# Patient Record
Sex: FEMALE | Race: WHITE | NOT HISPANIC OR LATINO | Employment: PART TIME | URBAN - METROPOLITAN AREA
[De-identification: names, ages, dates, MRNs, and addresses within clinical notes are randomized per-mention and may not be internally consistent; named-entity substitution may affect disease eponyms.]

---

## 2023-11-14 ENCOUNTER — APPOINTMENT (OUTPATIENT)
Dept: ORTHOPEDIC SURGERY | Facility: CLINIC | Age: 23
End: 2023-11-14
Payer: COMMERCIAL

## 2023-12-14 ENCOUNTER — OFFICE VISIT (OUTPATIENT)
Dept: ORTHOPEDIC SURGERY | Facility: CLINIC | Age: 23
End: 2023-12-14
Payer: COMMERCIAL

## 2023-12-14 DIAGNOSIS — Q78.0: Primary | ICD-10-CM

## 2023-12-14 DIAGNOSIS — M54.6 THORACIC SPINE PAIN: ICD-10-CM

## 2023-12-14 DIAGNOSIS — R07.81 RIB PAIN ON RIGHT SIDE: ICD-10-CM

## 2023-12-14 DIAGNOSIS — M54.50 LOW BACK PAIN ASSOCIATED WITH A SPINAL DISORDER OTHER THAN RADICULOPATHY OR SPINAL STENOSIS: ICD-10-CM

## 2023-12-14 PROCEDURE — 99204 OFFICE O/P NEW MOD 45 MIN: CPT | Performed by: PHYSICAL MEDICINE & REHABILITATION

## 2023-12-14 RX ORDER — RISPERIDONE 0.5 MG/1
0.5 TABLET ORAL
COMMUNITY
End: 2024-05-28 | Stop reason: WASHOUT

## 2023-12-14 RX ORDER — TIZANIDINE 4 MG/1
4 TABLET ORAL EVERY 6 HOURS PRN
Qty: 45 TABLET | Refills: 1 | Status: SHIPPED | OUTPATIENT
Start: 2023-12-14 | End: 2024-01-06

## 2023-12-14 RX ORDER — RIZATRIPTAN BENZOATE 10 MG/1
TABLET ORAL
COMMUNITY

## 2023-12-14 SDOH — SOCIAL STABILITY: SOCIAL NETWORK: SOCIAL ACTIVITY:: 7

## 2023-12-14 NOTE — PROGRESS NOTES
PM&R  / Ortho clinic eval:    IMPRESSION:    Lumbar injury lifting her infant October 1, possible lumbar posterior element fracture  Thoracic pain and rib pain on the right is probably exacerbated by abnormal lumbar mechanics.  Separately also has severe thoracic pain, radiating between her shoulder blades, she does have a history of rib fracture and may have some intercostal neuritis, but also could have endplate fracture there  History of osteogenesis imperfecta type I with over 50 fractures in her young life already    RECOMMENDATIONS:  -Needs lumbar and thoracic MRI  without contrast to help exclude subtle fracture.  This is extremely necessary but there history of osteogenesis imperfecta and multiple fractures in the past, but in addition she has failure of physical therapy since October 1, 2023, and unable to take anti-inflammatories because of breast-feeding  -She is agreeable to stop breast-feeding, this may help reduce relaxin hormone and give her joints and bones a little bit more support.  In this case we will prescribe tizanidine, continue lidocaine patch and Tylenol.  If there is no fracture on the MRI she could start ibuprofen or Aleve  f/u with Dr. Marcia Howard D.O. in her fellows clinic, I will try to attend in case she needs trigger point or intercostal nerve blocks with ultrasound.    Diagnoses and plan discussed with the patient, patient educated on above diagnoses and treatments, including alternatives     Juan Arguelles MD, FAAPMR, R-MSK  Chief, Division of PM&R  Board Certified in PM&R and Sports Medicine  ____________________________________________________________________    12/14/2023    CC: Patient complains of LBP    HPI:   Young mom not working - h/o Osteogenesis imperfecta type 1 with h/o multiple fractures - ~60  4 spinal fx, all long bones, ribs, sternum.     Seen at the request of self for  LBP since Oct 1 - . Had first child July '23 - was getting baby out of car and felt pull  in low back - had xray at [facility] read as negative.    The pain is right lumbar without radiation,  increases with twisting, bending and is relieved by rest but also painful to change positions in bed.    Pain scale  8/10 on average and 10/10 worst pain in past week.        Separately she is also having pain in the thoracic spine with radiation to both sides, up between her shoulder blades, this part not down her limbs.    Patient reports no fevers, chills, sweats, night pain, weight loss or cancer history . No bowel or bladder issues    I reviewed the Orthopedic Surgery Adult Patient History Sheet from 12/14/2023  including pertinent history and ROS.      Pertinent Physical Exam:  MSK: Thoraco-lumbar Spine: Mod reduced ROM all planes with pain in all planes but quite limited with right lateral bending and extension with reactive spasm.  Thoracic spine also moderately reduced not tested aggressively, tender to palpation right paraspinals about L3 and mid thoracic paraspinals bilateral, SI joint maneuvers negative.  Str Leg Raise negative but does increased low back pain significantly, hip provocative maneuvers negative .  Neuro: Normal Sensation, strength, bulk and tone of upper  lower limbs bilaterally, reflexes normal    SUPPORTING DOCUMENTATION (remaining history, exam, other findings):  Work-up reviewed - this has included x-rays at [facility] are unavailable but negative per patient.  Previous x-rays did show some type of slippage in the lumbar spine    Treatment has included has prescription for lidocaine patch, takes Tylenol, unable to take others because of breast-feeding, she was already in physical therapy for different problems when she did this in October 1, and just restarted with lumbar treatment and therapy then    See above for Assessment and Plan

## 2023-12-27 ENCOUNTER — OFFICE VISIT (OUTPATIENT)
Dept: ORTHOPEDIC SURGERY | Facility: CLINIC | Age: 23
End: 2023-12-27
Payer: COMMERCIAL

## 2023-12-27 DIAGNOSIS — R07.81 RIB PAIN: ICD-10-CM

## 2023-12-27 PROCEDURE — 76942 ECHO GUIDE FOR BIOPSY: CPT | Performed by: STUDENT IN AN ORGANIZED HEALTH CARE EDUCATION/TRAINING PROGRAM

## 2023-12-27 PROCEDURE — 20552 NJX 1/MLT TRIGGER POINT 1/2: CPT | Performed by: STUDENT IN AN ORGANIZED HEALTH CARE EDUCATION/TRAINING PROGRAM

## 2023-12-27 PROCEDURE — 99213 OFFICE O/P EST LOW 20 MIN: CPT | Performed by: STUDENT IN AN ORGANIZED HEALTH CARE EDUCATION/TRAINING PROGRAM

## 2023-12-27 PROCEDURE — 2500000004 HC RX 250 GENERAL PHARMACY W/ HCPCS (ALT 636 FOR OP/ED): Performed by: STUDENT IN AN ORGANIZED HEALTH CARE EDUCATION/TRAINING PROGRAM

## 2023-12-27 PROCEDURE — 2500000005 HC RX 250 GENERAL PHARMACY W/O HCPCS: Performed by: STUDENT IN AN ORGANIZED HEALTH CARE EDUCATION/TRAINING PROGRAM

## 2023-12-27 RX ORDER — TRIAMCINOLONE ACETONIDE 40 MG/ML
20 INJECTION, SUSPENSION INTRA-ARTICULAR; INTRAMUSCULAR ONCE
Status: COMPLETED | OUTPATIENT
Start: 2023-12-27 | End: 2023-12-27

## 2023-12-27 RX ORDER — LIDOCAINE HYDROCHLORIDE 10 MG/ML
1 INJECTION, SOLUTION EPIDURAL; INFILTRATION; INTRACAUDAL; PERINEURAL ONCE
Status: COMPLETED | OUTPATIENT
Start: 2023-12-27 | End: 2023-12-27

## 2023-12-27 RX ADMIN — LIDOCAINE HYDROCHLORIDE 10 MG: 10 INJECTION, SOLUTION EPIDURAL; INFILTRATION; INTRACAUDAL; PERINEURAL at 09:33

## 2023-12-27 RX ADMIN — TRIAMCINOLONE ACETONIDE 20 MG: 40 INJECTION, SUSPENSION INTRA-ARTICULAR; INTRAMUSCULAR at 09:34

## 2023-12-27 ASSESSMENT — PAIN SCALES - GENERAL: PAINLEVEL_OUTOF10: 10 - WORST POSSIBLE PAIN

## 2023-12-27 NOTE — PROGRESS NOTES
Assessment     Laisha is a 23 y.o. female with significant past medical history of MDD, osteogenesis imperfecta, recent delivery ( ) , who presents with worsening intercostal pain .  The patient's symptoms, clinical exam and imaging studies are suggestive of  costochondritis  .  The other possible differential diagnosis(es) include(s):  myofacial pain.      Her symptoms have worsened since the last visit. Hasn't had her Mri as of yet.    Injections Hx:    Date/Injection Type/Location/%relief/ Lasting  - No Hx    Plan     At this time, I would like to make the following recommendation/plan:  1.  Physical Therapy: currently enrolled in PT (since October).  2.  Medication: tylenol prn   3.  Injections: Trigger point injection to intercostal /transverse abdominal muscles. See procedure note below  4.  Imaging: MRI thoracic/ lumbar spine pending     Follow up:  Follow up for MRI review     US Guided trigger point Injection:    Indication: costochondritis     Procedure: Sonographically guided abdominal and intercostal trigger point  injection    Informed Consent: Following denial of allergy and review of potential side effects and complications including, but not limited to, infection, allergic reaction, local tissue breakdown, systemic effects of corticosteroids, elevation of blood glucose, injury to soft tissue and/or nerves, the patient indicated understanding and agreed to proceed.    Technique:  Pre-procedural scanning was performed to determine optimal needle approach for the procedure. The patient was prepped in the usual sterile fashion. Live sonographic guidance with a [12-5] mHz [linear] array transducer was used throughout the procedure. A 22 gauge needle was inserted into the superficial intercostal space using in plane approach. 1ml 1% lidocaine and 0.5 ml of kenalog (20mg) was then injected. The needle was removed and the area cleansed and dressed.    Post-Procedure Instructions: The patient  tolerated the procedure well without complication and was discharged in good condition after a short observation period. The patient was instructed to avoid submerging the procedure site in water for 48-72 hours. The patient was instructed to contact me with any questions pertaining to the procedure and to inform me of the results of the procedure in approximately 7-10 days, as needed. Questions regarding general management should be directed to the patient's referring provider and the patient should keep all previously scheduled follow up appointments.    Multiple key images were saved.      Subjective    Chief Complaint: Right chest wall pain     HPI:  Laisha Moya is a 23 y.o. female, new mom, with pertinent PMH of  MDD, osteogenesis imperfecta, recent delivery ( ) who is following up today for costocondralgia.  She was last seen on 23. Plan at the time was to obtain MRI of thoracic and lumbar spine and consider intercostal nerve block in the future.  Since her last visit symptoms have improved  Today, pain is located right anterior chest wall under her right breast, described as ruth non-radiating, 7/10. Unable to hold her baby 2/2 pain.    Since last visit:  - Physical Therapy: continuing with PT   - Imaging:  MRI scheduled for 2024      ROS:   12-point review of systems was completed and is otherwise negative except as noted in the HPI.      Objective     Physical Exam  General:  Appears state age, in NAD, and well nourished  Psychological:  Normal mood and affect  Pulm:  Breathing comfortably on RA  Gait:  normal without assistive device =  Inspection:   - erythema noted on right breast   Palpation:  - tenderness to palpation of  right ~T7-T8 intercostal space    Imaging and Other Studies:    No results found.

## 2024-01-08 ENCOUNTER — APPOINTMENT (OUTPATIENT)
Dept: RADIOLOGY | Facility: CLINIC | Age: 24
End: 2024-01-08
Payer: COMMERCIAL

## 2024-01-11 ENCOUNTER — HOSPITAL ENCOUNTER (OUTPATIENT)
Dept: RADIOLOGY | Facility: CLINIC | Age: 24
Discharge: HOME | End: 2024-01-11
Payer: COMMERCIAL

## 2024-01-11 ENCOUNTER — APPOINTMENT (OUTPATIENT)
Dept: RADIOLOGY | Facility: CLINIC | Age: 24
End: 2024-01-11
Payer: COMMERCIAL

## 2024-01-11 DIAGNOSIS — Q78.0: ICD-10-CM

## 2024-01-11 DIAGNOSIS — R07.81 RIB PAIN ON RIGHT SIDE: ICD-10-CM

## 2024-01-11 DIAGNOSIS — M54.6 THORACIC SPINE PAIN: ICD-10-CM

## 2024-01-11 DIAGNOSIS — M54.50 LOW BACK PAIN ASSOCIATED WITH A SPINAL DISORDER OTHER THAN RADICULOPATHY OR SPINAL STENOSIS: ICD-10-CM

## 2024-01-11 PROCEDURE — 72146 MRI CHEST SPINE W/O DYE: CPT

## 2024-01-11 PROCEDURE — 72146 MRI CHEST SPINE W/O DYE: CPT | Performed by: RADIOLOGY

## 2024-01-11 PROCEDURE — 72148 MRI LUMBAR SPINE W/O DYE: CPT

## 2024-01-11 PROCEDURE — 72148 MRI LUMBAR SPINE W/O DYE: CPT | Performed by: RADIOLOGY

## 2024-01-17 ENCOUNTER — APPOINTMENT (OUTPATIENT)
Dept: ORTHOPEDIC SURGERY | Facility: CLINIC | Age: 24
End: 2024-01-17
Payer: COMMERCIAL

## 2024-01-17 ENCOUNTER — OFFICE VISIT (OUTPATIENT)
Dept: ORTHOPEDIC SURGERY | Facility: CLINIC | Age: 24
End: 2024-01-17
Payer: COMMERCIAL

## 2024-01-17 DIAGNOSIS — M94.0 COSTOCHONDRITIS: Primary | ICD-10-CM

## 2024-01-17 PROCEDURE — 99213 OFFICE O/P EST LOW 20 MIN: CPT | Performed by: STUDENT IN AN ORGANIZED HEALTH CARE EDUCATION/TRAINING PROGRAM

## 2024-01-17 NOTE — PROGRESS NOTES
Assessment     Laisha is a 23 y.o. female with significant past medical history of MDD, osteogenesis imperfecta, recent delivery ( ) , who presents with worsening intercostal pain .  The patient's symptoms, clinical exam and imaging studies are suggestive of  costochondritis  .  The other possible differential diagnosis(es) include(s):  myofacial pain.      Her symptoms have worsened since the last visit.  Thoracolumbar MRI was completed and unremarkable     Injections Hx:    Date/Injection Type/Location/%relief/ Lasting  - No Hx    Plan     At this time, I would like to make the following recommendation/plan:  1.  Physical Therapy: Currently enrolled in PT (since October).  2.  Medication: tylenol prn, advised to try topical treatment and c/w lidocaine patches   3.  Injections: s/p Trigger point injection to intercostal /transverse abdominal muscles. See procedure note below  4.  Imaging: MRI thoracic/ lumbar spine completed unremarkable   5.  Consider intercostal n. Block under fluoro vs intervertebral n. Block (referral to anesthesia pain)     Follow up:  Follow up after completing breast feeding    Subjective    Chief Complaint: Right chest wall pain     HPI:  Laisha Moya is a 23 y.o. female, new mom, with pertinent PMH of  MDD, osteogenesis imperfecta, recent delivery ( ) who is following up today for costocondralgia.  She was last seen on 23. Plan at the time was to obtain MRI of thoracic and lumbar spine and consider intercostal nerve block in the future.  Since her last visit symptoms have worsened  Today, pain is located right anterior chest wall under her right breast, described as ruth non-radiating, 7/10. Unable to hold her baby 2/2 pain.    Since last visit:  - Physical Therapy: continuing with PT   - Imaging:  MRI thoracolumbar spine completed   - s/p injection with worsening symptoms post trigger point injection.     ROS:   12-point review of systems was  completed and is otherwise negative except as noted in the HPI.      Objective     Physical Exam  General:  Appears state age, in NAD, and well nourished  Psychological:  Normal mood and affect  Pulm:  Breathing comfortably on RA  Gait:  normal without assistive device   Palpation:  - tenderness to palpation of  right ~T7-T8 intercostal space    Imaging and Other Studies:    No results found.

## 2024-02-13 DIAGNOSIS — M93.29: ICD-10-CM

## 2024-02-29 ENCOUNTER — APPOINTMENT (OUTPATIENT)
Dept: ORTHOPEDIC SURGERY | Facility: CLINIC | Age: 24
End: 2024-02-29
Payer: COMMERCIAL

## 2024-05-22 ENCOUNTER — HOSPITAL ENCOUNTER (OUTPATIENT)
Dept: RADIOLOGY | Facility: EXTERNAL LOCATION | Age: 24
Discharge: HOME | End: 2024-05-22
Payer: COMMERCIAL

## 2024-05-22 DIAGNOSIS — M54.50 BACK PAIN AT L4-L5 LEVEL: ICD-10-CM

## 2024-05-28 ENCOUNTER — OFFICE VISIT (OUTPATIENT)
Dept: OBSTETRICS AND GYNECOLOGY | Facility: CLINIC | Age: 24
End: 2024-05-28
Payer: COMMERCIAL

## 2024-05-28 VITALS
BODY MASS INDEX: 28.32 KG/M2 | HEIGHT: 60 IN | WEIGHT: 144.25 LBS | SYSTOLIC BLOOD PRESSURE: 110 MMHG | DIASTOLIC BLOOD PRESSURE: 78 MMHG

## 2024-05-28 DIAGNOSIS — Z30.09 ENCOUNTER FOR GENERAL COUNSELING AND ADVICE ON CONTRACEPTIVE MANAGEMENT: Primary | ICD-10-CM

## 2024-05-28 PROCEDURE — 1036F TOBACCO NON-USER: CPT | Performed by: ADVANCED PRACTICE MIDWIFE

## 2024-05-28 PROCEDURE — 99203 OFFICE O/P NEW LOW 30 MIN: CPT | Performed by: ADVANCED PRACTICE MIDWIFE

## 2024-05-28 RX ORDER — CYCLOBENZAPRINE HCL 10 MG
TABLET ORAL
COMMUNITY
Start: 2024-05-21

## 2024-05-28 RX ORDER — ACETAMINOPHEN 500 MG
10000 TABLET ORAL
COMMUNITY

## 2024-05-28 RX ORDER — SERTRALINE HYDROCHLORIDE 100 MG/1
200 TABLET, FILM COATED ORAL DAILY
COMMUNITY

## 2024-05-28 NOTE — PROGRESS NOTES
"Raghavendra Moya is a 23 y.o. female who is here for a routine annual gynecology exam. No vaginal concerns at this time including abnormal discharge, odor or discomfort. She is sexually active and has no concern for STI exposure. She has no pain or bleeding with sex. She denies bladder or bowel concerns. She denies breast changes or concerns.    Concerns for this visit:     Current contraception: {contraceptive method:5051}  LMP: ***  Menses:   Last pap: ***  History of abnormal Pap smear: {yes/no:98864::\"no\"}  Due for pap: {yes/no:21434::\"no\"}  Family history of uterine or ovarian cancer: {yes***/no:01602::\"no\"}  Family history of prostate cancer: {yes***/no:62330::\"no\"}  Family history of pancreatic cancer: {yes***/no:37109::\"no\"}  Family hx of BRCA1/BRCA2: {yes***/no:01765::\"no\"}  Family history of breast cancer: {yes***/no:86143::\"no\"}  Last mammogram: ***  Gardasil: ***    OB History    No obstetric history on file.         Review of Systems    Objective   There were no vitals taken for this visit.  Physical Exam     Assessment/Plan: 23 y.o. yo woman for annual GYN exam    Diagnoses and all orders for this visit:  Well woman exam with routine gynecological exam       Points of Discussion:    Abnormal bleeding parameters that would prompt a call  Current PAP guidelines (ASCCP)  Self breast exam encouraged. Mammogram screening by current guidelines  Diet and exercise  Routine follow up with PCP for health maintenance examination encouraged   Follow up results by portal unless otherwise indicated    F/U 1 year or as needed  "

## 2024-06-01 PROBLEM — F32.9 MDD (MAJOR DEPRESSIVE DISORDER): Status: ACTIVE | Noted: 2023-12-05

## 2024-06-01 PROBLEM — G43.719 INTRACTABLE CHRONIC MIGRAINE WITHOUT AURA: Status: ACTIVE | Noted: 2020-06-24

## 2024-06-01 PROBLEM — G47.10 HYPERSOMNIA: Status: ACTIVE | Noted: 2019-08-20

## 2024-06-01 PROBLEM — M79.18 MYOFASCIAL PAIN: Status: ACTIVE | Noted: 2023-10-10

## 2024-06-01 PROBLEM — F51.04 CHRONIC INSOMNIA: Status: ACTIVE | Noted: 2021-08-17

## 2024-06-01 PROBLEM — M62.89 HIGH-TONE PELVIC FLOOR DYSFUNCTION: Status: ACTIVE | Noted: 2023-08-01

## 2024-06-01 PROBLEM — R27.8 MUSCULAR INCOORDINATION: Status: ACTIVE | Noted: 2024-05-13

## 2024-06-01 PROBLEM — Q07.9: Status: ACTIVE | Noted: 2023-05-16

## 2024-06-01 PROBLEM — M41.9 SCOLIOSIS: Status: ACTIVE | Noted: 2023-02-13

## 2024-12-28 ENCOUNTER — ANCILLARY PROCEDURE (OUTPATIENT)
Dept: URGENT CARE | Age: 24
End: 2024-12-28
Payer: COMMERCIAL

## 2024-12-28 ENCOUNTER — OFFICE VISIT (OUTPATIENT)
Dept: URGENT CARE | Age: 24
End: 2024-12-28
Payer: COMMERCIAL

## 2024-12-28 VITALS
HEART RATE: 109 BPM | DIASTOLIC BLOOD PRESSURE: 68 MMHG | RESPIRATION RATE: 20 BRPM | SYSTOLIC BLOOD PRESSURE: 129 MMHG | WEIGHT: 144 LBS | OXYGEN SATURATION: 100 % | TEMPERATURE: 97.9 F | BODY MASS INDEX: 28.12 KG/M2

## 2024-12-28 DIAGNOSIS — M79.672 LEFT FOOT PAIN: Primary | ICD-10-CM

## 2024-12-28 DIAGNOSIS — M25.572 ACUTE LEFT ANKLE PAIN: ICD-10-CM

## 2024-12-28 DIAGNOSIS — S82.832A OTHER CLOSED FRACTURE OF DISTAL END OF LEFT FIBULA, INITIAL ENCOUNTER: ICD-10-CM

## 2024-12-28 DIAGNOSIS — M79.672 LEFT FOOT PAIN: ICD-10-CM

## 2024-12-28 PROCEDURE — 73630 X-RAY EXAM OF FOOT: CPT | Mod: LEFT SIDE | Performed by: FAMILY MEDICINE

## 2024-12-28 PROCEDURE — 73610 X-RAY EXAM OF ANKLE: CPT | Mod: LEFT SIDE | Performed by: FAMILY MEDICINE

## 2024-12-31 ENCOUNTER — OFFICE VISIT (OUTPATIENT)
Dept: ORTHOPEDIC SURGERY | Facility: CLINIC | Age: 24
End: 2024-12-31
Payer: COMMERCIAL

## 2024-12-31 VITALS — HEIGHT: 61 IN | WEIGHT: 140 LBS | BODY MASS INDEX: 26.43 KG/M2

## 2024-12-31 DIAGNOSIS — S82.832A CLOSED FRACTURE OF DISTAL END OF LEFT FIBULA, UNSPECIFIED FRACTURE MORPHOLOGY, INITIAL ENCOUNTER: ICD-10-CM

## 2024-12-31 PROCEDURE — 1036F TOBACCO NON-USER: CPT | Performed by: FAMILY MEDICINE

## 2024-12-31 PROCEDURE — 99204 OFFICE O/P NEW MOD 45 MIN: CPT | Performed by: FAMILY MEDICINE

## 2024-12-31 PROCEDURE — 99213 OFFICE O/P EST LOW 20 MIN: CPT | Mod: 57 | Performed by: FAMILY MEDICINE

## 2024-12-31 PROCEDURE — L4361 PNEUMA/VAC WALK BOOT PRE OTS: HCPCS | Performed by: FAMILY MEDICINE

## 2024-12-31 PROCEDURE — 27786 TREATMENT OF ANKLE FRACTURE: CPT | Performed by: FAMILY MEDICINE

## 2024-12-31 PROCEDURE — 3008F BODY MASS INDEX DOCD: CPT | Performed by: FAMILY MEDICINE

## 2024-12-31 ASSESSMENT — PATIENT HEALTH QUESTIONNAIRE - PHQ9
2. FEELING DOWN, DEPRESSED OR HOPELESS: NOT AT ALL
1. LITTLE INTEREST OR PLEASURE IN DOING THINGS: NOT AT ALL
SUM OF ALL RESPONSES TO PHQ9 QUESTIONS 1 AND 2: 0

## 2025-01-01 NOTE — PROGRESS NOTES
Acute Injury New Patient Visit    CC:   Chief Complaint   Patient presents with    Left Ankle - Pain, New Patient Visit     Rolled Left Ankle in Hole 12/28/24 - X-Rays @ [facility]       HPI: Laisha is a 24 y.o.female who presents today with new complaints of acute pain discomfort to the lateral sided left ankle.  She states she tripped in a hole going down the porch steps the other day she was seen and evaluated at local urgent care where she was given a short painful walking boot which does not really fit well and provides more discomfort than without.  She presents here today for further evaluation.  She has a known history of osteogenesis imperfecta and takes supplements and vitamin D to assist with her bones.        Review of Systems   GENERAL: Negative for malaise, significant weight loss, fever  MUSCULOSKELETAL: See HPI  NEURO: Negative for numbness / tingling     Past Medical History  History reviewed. No pertinent past medical history.    Medication review  Medication Documentation Review Audit       Reviewed by Cole C Budinsky, MD (Physician) on 01/01/25 at 1240      Medication Order Taking? Sig Documenting Provider Last Dose Status   cholecalciferol (Vitamin D-3) 5,000 Units tablet [id number]  Take 2 tablets (10,000 Units) by mouth once daily. Historical MD [place]  Active   cyclobenzaprine (Flexeril) 10 mg tablet [id number]  PLEASE SEE ATTACHED FOR DETAILED DIRECTIONS Deshawn Ho MD  Active   linaclotide (LINZESS ORAL) [id number]  Take by mouth. Deshawn Ho MD  Active   PRENATAL VIT 10-IRON-FOLIC-DHA ORAL [id number]  Take by mouth. Deshawn Ho MD  Active   rizatriptan (Maxalt) 10 mg tablet [id number]  TAKE 1 TABLET BY MOUTH AS NEEDED FOR MIGRAINE CAN REPEAT IN 2 HOURS IF NEEDED LIMIT 2 TABLETS PER 24 HOURS Deshawn Ho MD  Active   sertraline (Zoloft) 100 mg tablet [id number]  Take 2 tablets (200 mg) by mouth once daily. Deshawn Ho MD  Active    tiZANidine (Zanaflex) 4 mg tablet [id number]  Take 1 tablet (4 mg) by mouth every 6 hours if needed for muscle spasms for up to 23 days. Juan Arguelles MD   [phone]                    Allergies  Allergies   Allergen Reactions    Suprax [Cefixime] Hives       Social History  Social History     Socioeconomic History    Marital status: Single     Spouse name: Not on file    Number of children: Not on file    Years of education: Not on file    Highest education level: Not on file   Occupational History    Not on file   Tobacco Use    Smoking status: Never    Smokeless tobacco: Never   Vaping Use    Vaping status: Never Used   Substance and Sexual Activity    Alcohol use: Never    Drug use: Never    Sexual activity: Not Currently   Other Topics Concern    Not on file   Social History Narrative    Not on file     Social Drivers of Health     Financial Resource Strain: Patient Declined (12/3/2023)    Received from [facility]    Overall Financial Resource Strain (CARDIA)     Difficulty of Paying Living Expenses: Patient declined   Food Insecurity: No Food Insecurity (12/3/2023)    Received from [facility]    Hunger Vital Sign     Worried About Running Out of Food in the Last Year: Never true     Ran Out of Food in the Last Year: Never true   Transportation Needs: No Transportation Needs (12/3/2023)    Received from [facility]    PRAPARE - Transportation     Lack of Transportation (Medical): No     Lack of Transportation (Non-Medical): No   Physical Activity: Patient Declined (12/3/2023)    Received from [facility]    Exercise Vital Sign     Days of Exercise per Week: Patient declined     Minutes of Exercise per Session: Patient declined   Stress: Stress Concern Present (12/3/2023)    Received from [facility]    [place] [employer] - Occupational Stress Questionnaire      Feeling of Stress : To some extent   Social Connections: Unknown (12/3/2023)    [facility]    Social Connection and Isolation Panel [NHANES]     Frequency of Communication with Friends and Family: More than three times a week     Frequency of Social Gatherings with Friends and Family: Twice a week     Attends Orthodox Services: Patient declined     Active Member of Clubs or Organizations: No     Attends Club or Organization Meetings: Never     Marital Status: Living with partner   Intimate Partner Violence: Not on file   Housing Stability: Unknown (12/3/2023)    [facility]    Housing Stability Vital Sign     Unable to Pay for Housing in the Last Year: Patient refused     Number of Places Lived in the Last Year: 1     Unstable Housing in the Last Year: No       Surgical History  History reviewed. No pertinent surgical history.    Physical Exam:  GENERAL:  Patient is awake, alert, and oriented to person place and time.  Patient appears well nourished and well kept.  Affect Calm, Not Acutely Distressed.  HEENT:  Normocephalic, Atraumatic, EOMI  CARDIOVASCULAR:  Hemodynamically stable.  RESPIRATORY:  Normal respirations with unlabored breathing.  NEURO: Gross sensation intact to the lower extremities bilaterally.  Extremity: Left ankle exam: The affected ankle was examined and inspected.  There was evidence of lateral sided soft tissue swelling and fullness with mild bruising noted.  The distal fibula had mild tenderness to palpation, the distal medial malleolus was non-tender.  Tenderness across the anterior joint space and over the soft tissues in the area of the ATFL and CFL ligaments.  Negative Heel Tap and Calcaneal Squeeze, Achilles is non-tender.  Negative Ambar´s and Shaw sign.  Negative Midfoot and distal metatarsal squeeze.  Distal pulses and sensation are intact with good distal cap refill.  Active and passive ROM and  strength about the ankle is limited with Dorsiflexion, Plantarflexion, Eversion, and Inversion. Unable to tolerate full weight bearing secondary to pain.      Diagnostics: Previous imaging reviewed consistent with nondisplaced left distal fibula fracture        Procedure: None  Procedures    Assessment:   Problem List Items Addressed This Visit    None  Visit Diagnoses       Closed fracture of distal end of left fibula, unspecified fracture morphology, initial encounter        Relevant Orders    Walking boot             Plan: Discussed the nonoperative nature of this injury with the patient and family at the bedside.  She is agreeable and willing to proceed forward.  She was provided with a better fitting more adequate and supportive tall walking boot here today.  Recommended ice Tylenol anti-inflammatories as needed for pain control she may protect her weightbearing with the boot and any necessary crutches if needed going forward.  Will plan on seeing her back in 4 weeks for repeat evaluation repeat x-rays 3 views of the left ankle at that time.  Will hopefully transition to a lace up ankle brace at that time.  Additionally we could consider a hinged ankle brace with any persistent or worsening pain due to her likely anticipated slow/delayed healing due to her known osteogenesis imperfecta.  We can revisit this at follow-up.  Repeat x-rays 3 views of the left ankle next visit  Orders Placed This Encounter    Walking boot      At the conclusion of the visit there were no further questions by the patient/family regarding their plan of care.  Patient was instructed to call or return with any issues, questions, or concerns regarding their injury and/or treatment plan described above.     01/01/25 at 12:43 PM - Cole C Budinsky, MD    Office: [phone]    This note was prepared using voice recognition software.  The details of this note are correct and have been reviewed, and corrected to the best of my ability.   Some grammatical errors may persist related to the Dragon software.

## 2025-01-28 ENCOUNTER — HOSPITAL ENCOUNTER (OUTPATIENT)
Dept: RADIOLOGY | Facility: CLINIC | Age: 25
Discharge: HOME | End: 2025-01-28
Payer: COMMERCIAL

## 2025-01-28 ENCOUNTER — OFFICE VISIT (OUTPATIENT)
Dept: ORTHOPEDIC SURGERY | Facility: CLINIC | Age: 25
End: 2025-01-28
Payer: COMMERCIAL

## 2025-01-28 DIAGNOSIS — S82.832A CLOSED FRACTURE OF DISTAL END OF LEFT FIBULA, UNSPECIFIED FRACTURE MORPHOLOGY, INITIAL ENCOUNTER: ICD-10-CM

## 2025-01-28 DIAGNOSIS — S82.832A CLOSED FRACTURE OF DISTAL END OF LEFT FIBULA, UNSPECIFIED FRACTURE MORPHOLOGY, INITIAL ENCOUNTER: Primary | ICD-10-CM

## 2025-01-28 PROCEDURE — 1036F TOBACCO NON-USER: CPT | Performed by: FAMILY MEDICINE

## 2025-01-28 PROCEDURE — 73610 X-RAY EXAM OF ANKLE: CPT | Mod: LEFT SIDE | Performed by: FAMILY MEDICINE

## 2025-01-28 PROCEDURE — 99211 OFF/OP EST MAY X REQ PHY/QHP: CPT | Performed by: FAMILY MEDICINE

## 2025-01-28 PROCEDURE — 73610 X-RAY EXAM OF ANKLE: CPT | Mod: LT

## 2025-01-28 PROCEDURE — L1902 AFO ANKLE GAUNTLET PRE OTS: HCPCS | Performed by: FAMILY MEDICINE

## 2025-01-28 PROCEDURE — 99024 POSTOP FOLLOW-UP VISIT: CPT | Performed by: FAMILY MEDICINE

## 2025-01-28 NOTE — PROGRESS NOTES
Established Patient Follow-Up Visit    CC:   Chief Complaint   Patient presents with    Left Ankle - Follow-up       HPI:  Laisha is a 24 y.o. female returns here today for follow-up visit regarding: Left distal fibula fracture.  She denies any numbness tingling or burning overall feeling some improvement in symptoms she is hopeful to come out of the boot.          REVIEW OF SYSTEMS:  GENERAL: Negative for malaise, significant weight loss, fever  MUSCULOSKELETAL: See HPI  NEURO: Negative for numbness / tingling       PHYSICAL EXAM:  -Neuro: Gross sensation intact to the lower extremities bilaterally.  -Extremity: Left ankle exam demonstrate skin which is warm pink well-perfused no open cuts wounds or sores mild tenderness palpation over the distal fibula with no significant swelling.  Subtle laxity with anterior drawer.  She has full plantarflexion dorsiflexion eversion inversion.  Negative tib-fib squeeze proximally distally calf is soft and nontender    IMAGING: Repeat x-rays today demonstrate stable appearing nondisplaced left distal fibular fracture with subtle increase sclerotic callus formation.      PROCEDURE: None  Procedures     ASSESSMENT:   Follow-up visit for:  Problem List Items Addressed This Visit    None  Visit Diagnoses       Closed fracture of distal end of left fibula, unspecified fracture morphology, initial encounter    -  Primary    Relevant Orders    XR ankle left 3+ views    Ankle Brace, Lace Up or A60             PLAN: At this time we will begin the transition out of the boot into the brace.  She can continue with her vitamin D supplementation.  Will plan to see her back in 4 weeks for repeat evaluation repeat x-rays 3 views of the right ankle at follow-up.  She is to call or return sooner with any issues in the interim.  She is agreeable to home exercises here today.  Orders Placed This Encounter    Ankle Brace, Lace Up or A60    XR ankle left 3+ views           At the conclusion of  the visit there were no further questions by the patient/family regarding their plan of care.  Patient was instructed to call or return with any issues, questions, or concerns regarding their injury and/or treatment plan described above.     01/28/25 at 12:19 PM - Cole C Budinsky, MD    Office: [phone]    This note was prepared using voice recognition software.  The details of this note are correct and have been reviewed, and corrected to the best of my ability.  Some grammatical errors may persist related to the Dragon software.

## 2025-03-04 ENCOUNTER — APPOINTMENT (OUTPATIENT)
Dept: ORTHOPEDIC SURGERY | Facility: CLINIC | Age: 25
End: 2025-03-04
Payer: COMMERCIAL

## 2025-05-29 DIAGNOSIS — Q78.0 OSTEOGENESIS IMPERFECTA TYPE I: ICD-10-CM

## 2025-05-29 DIAGNOSIS — R07.81 RIB PAIN ON RIGHT SIDE: ICD-10-CM

## 2025-05-29 DIAGNOSIS — M54.50 LOW BACK PAIN ASSOCIATED WITH A SPINAL DISORDER OTHER THAN RADICULOPATHY OR SPINAL STENOSIS: ICD-10-CM

## 2025-05-29 RX ORDER — TIZANIDINE 4 MG/1
TABLET ORAL
Qty: 45 TABLET | Refills: 1 | OUTPATIENT
Start: 2025-05-29